# Patient Record
Sex: MALE | Race: WHITE
[De-identification: names, ages, dates, MRNs, and addresses within clinical notes are randomized per-mention and may not be internally consistent; named-entity substitution may affect disease eponyms.]

---

## 2017-11-09 ENCOUNTER — HOSPITAL ENCOUNTER (OUTPATIENT)
Dept: HOSPITAL 62 - RAD | Age: 37
End: 2017-11-09
Attending: FAMILY MEDICINE
Payer: MEDICARE

## 2017-11-09 DIAGNOSIS — R13.10: Primary | ICD-10-CM

## 2017-11-09 DIAGNOSIS — I10: ICD-10-CM

## 2017-11-09 DIAGNOSIS — K21.9: ICD-10-CM

## 2017-11-09 DIAGNOSIS — G40.909: ICD-10-CM

## 2017-11-09 PROCEDURE — 92611 MOTION FLUOROSCOPY/SWALLOW: CPT

## 2017-11-09 PROCEDURE — 74230 X-RAY XM SWLNG FUNCJ C+: CPT

## 2017-11-09 NOTE — ST MODIFIED BARIUM SWALLOW
Recommendation





- Recommendations


Recommendations: Patient at high risk of aspiration on all textures. Currently 

on safest PO texture, puree foods and pudding thick liquids. Consider 

alternative means of nutrition/hydration.





Medical Diagnoses





- Medical Diagnoses


Medical Diagnosis Description & ICD-10 Code(s): dysphagia, R13.10


Other Medical Diagnoses/Co-Morbidities: Facility caregiver reports: reflux, GERD

, HTN, epilepsy





ST Modified Barium Swallow





- General


Date: 11/09/17


Referring Physician: Bharathi Adorno MD


Risks/Precautions: Falls, Aspiration, Seizures


Reason for Referral: caregivers report coughing during meals





- History


History obtained from: Parent/Caregiver - Facility caregiver (nursing staff).


-: Medical - Patient's caregivers from facility present and provided medical 

history. Patient has developmental deficits and is a dependent feeder. He is 

currently on pureed solids and pudding thick liquids. Caregivers report that he 

has demonstrated coughing during meals frequently. Is seeing an occupational 

therapist for feeding, and is currently utilizing "head down, turn to left" 

positioning to aid in safe swallowing.


Medications: Caregiver reported:





- Functional Status


Prior Functional Status: INDEPENDENT: feeding - dependent feeder, on modified 

diet


Current Functional Limitations: feeding - dependent feeder, on modified diet





- Subjective


Patient/caregiver goal(s): r/o aspiration


Cognitive-Linguistic Function: Severely Impaired


Speech Intelligibility: Non-verbal


Current Nutritional Means: PO


Current PO diet: Pureed, Thickened liquids - pudding thick


Current symptoms: Poor intake, Coughing


Pain: unable to communicate, no signs/symptoms of pain





- Objective


Assessment: Upright, Left Lateral





- Food Trials Used


Food trials used: Pureed


The patient: fed by caregiver, via spoon





- Oral-Motor Skills


Dentition: Full


Laryngeal Function: no volitional swallow, no volitional cough/clear


Oral Motor Skills: 





Patient seen to have consistent open mouth posture, with head leaned back 

throughout exam. Seen to have difficulty taking trials from spoon, biting down 

on spoon, not using lips to clear spoon. Seen to hold food in mouth with mouth 

open as well.





- Assessment


Oral prep: Severely Impaired


Labial closure: Reduce closure


Leakage: None


Mastication: no chewing observed


Lingual Movement: Discoordinated


Oral stage: Significantly Impaired


Oral Stage: 





Patient seen to hold food trials in the mouth for extended period of time, 

mouth open with food on tongue. Little oral control seen.





- Pharyngeal Stage


Initiation of Pharyngeal Stage Reflex: Delayed


Reflex Delay Time (Seconds): 12


Decreased laryngeal elevation: No


Reduced pressure generation: Yes


reduced tongue-based retraction: Yes


Pre-swallow pooling in valleculae: Significant


Pre-Swallow pooling in pyriforms: Moderate


Reduced epiglottic excursion: No


Reduced pharyngeal peristalsis/contraction: Yes


Multiple Swallows with: Ineffective Clearance


Post-swallow residulas vallecular: Moderate


Post-Swallow residuals in pyriforms: Moderate


Reduced Cricopharyngeal opening: No


Pharyngeal Stage Comments: 


Significantly delayed swallow reflex seen for pudding trial. Significant 

residue seen in pyriform sinus after the swallow, which then was seen to spill 

over into airway. Minimal control of bolus seen.





- Fall Risk Assessment


Medications/Conditions that increase fall risks include: Antidepressants, 

sedatives, anti-arrhythmic, diuretic, benzodiazipenes, neuroleptics.  BP 

regulation problems, cardiac problems, balance or gait deficits, neurological 

problems.


Is patient considered at risk for falls: yes


Fall Risk Actions Taken: No action needed





- Behavioral Observations


Mental Status: Other - non-verbal





- Treatment / Educational Needs:


Treatment/Education Needs: Treatment consisted of patient education on the role 

of the Speech Pathologist.  Patient's plan of care and golas were communicated 

as well as scheduling and attendance policies.  Recommendations for initial 

home program were shared.  Patient demonstrated understanding and verbalized 

agreement.





- Impression/Summary


Laryngeal Penetration: Yes


Tracheal Aspiration: yes, deep, after swallow


Productive cough: No


Effective Clearing: no


Compesatory strategies: 





Unable to appropriately complete compensatory strategies.


Patient presents with: Oral-Pharyngeal dysph., Profound


Risk of Aspiration: Severe


Evaluation and Findings: Patient presented with profound oral and pharyngeal 

phase dysphagia. Poor bolus control seen orally, significantly delayed swallow 

reflex pharyngeally, aspiration of residue with poor ability to clear.  Current 

diet of puree and pudding liquids is deemed safest for patient, however, still 

at high risk for aspiration. Should consider alternative means of nutrition/

hydration.





- Recommendations


NPO: yes


Solid diet recommendations: Pureed - safest texure-still at high risk of 

aspiration


Strict aspiration precautions: Yes


Pt/Family education and followup with MD: Yes


Dysphagia therapy with SLP: f/u with current thera.


Recommended techniques: Fully Upright During Meal, Alternate Bites/Sips


Supervision: Constant


Information, Precautions and Recommendations: Family Member (Written), Family 

Member (Verbal)





- Time


Total Time: 20





- Plan of Care


Strategies to optimize patient understanding include:: ongoing assessment of 

educational needs, implementation of educational strategies, and re-education.





- -


-: Thank you for the opportunity to work with this patient and his/her family.  

Should you have any questions about this patient's plan or progress, I can be 

reached at [phone].





Charge G Code?





- -


-: Yes





ST F.L. Impairment Category





- Rationale Based On


Rationale Based On: Func. Asses. Tool Results





- Swallowing


Current : CM 80-99% Impaired


Goal : CM 80-99% Impaired


Discharge : CM 80-99% Impaired

## 2017-11-15 NOTE — RADIOLOGY REPORT (SQ)
EXAM DESCRIPTION:  YAAKOV SWALLOW



COMPLETED DATE/TIME:  11/9/2017 10:08 am



REASON FOR STUDY:  R13.10 DYSPHAGIA, UNSPECIFIED R13.10  DYSPHAGIA, UNSPECIFIED



COMPARISON:  None.



TECHNIQUE:  Videofluoroscopic swallowing examination was performed in conjunction with speech patholo
gy.  Videofluoroscopic imaging was obtained and reviewed and these are the findings:



RADIATION DOSE:  Fluoro time 4.3 minutes

1 images saved to PACS.



LIMITATIONS:  None



FINDINGS:  The patient was brought into the fluoro room and placed upright on a modified barium swall
ow chair.  The patient was then given multiple consistencies mixed with barium to swallow under live 
fluoroscopic video guidance.  According to the Speech Pathologist there was deep laryngeal penetratio
n seen from residuals. Please refer to the speech pathology report for further details.



IMPRESSION:  DEEP LARYNGEAL PENETRATION FROM RESIDUALS.  NO DEFINITE ASPIRATION IDENTIFIED.PLEASE SEE
 SPEECH PATHOLOGIST REPORT FOR OTHER FINDINGS AND RECOMMENDATIONS.



COMMENT:  None

Quality :  Final reports for procedures using fluoroscopy that document radiation exposure heath
anupam, or exposure time and number of fluorographic images (if radiation exposure indices are not avail
able)



TECHNICAL DOCUMENTATION:  JOB ID: [id number]

 2011 [employer]- All Rights Reserved

## 2018-02-19 ENCOUNTER — HOSPITAL ENCOUNTER (OUTPATIENT)
Dept: HOSPITAL 62 - OD | Age: 38
End: 2018-02-19
Attending: FAMILY MEDICINE
Payer: MEDICARE

## 2018-02-19 DIAGNOSIS — R05: Primary | ICD-10-CM

## 2018-02-19 LAB
ALBUMIN SERPL-MCNC: 5.1 G/DL (ref 3.5–5)
ALP SERPL-CCNC: 134 U/L (ref 38–126)
ALT SERPL-CCNC: 29 U/L (ref 21–72)
ANION GAP SERPL CALC-SCNC: 14 MMOL/L (ref 5–19)
AST SERPL-CCNC: 19 U/L (ref 17–59)
BILIRUB DIRECT SERPL-MCNC: 0.4 MG/DL (ref 0–0.4)
BILIRUB SERPL-MCNC: 0.7 MG/DL (ref 0.2–1.3)
BUN SERPL-MCNC: 7 MG/DL (ref 7–20)
CALCIUM: 9.4 MG/DL (ref 8.4–10.2)
CHLORIDE SERPL-SCNC: 103 MMOL/L (ref 98–107)
CO2 SERPL-SCNC: 24 MMOL/L (ref 22–30)
ERYTHROCYTE [DISTWIDTH] IN BLOOD BY AUTOMATED COUNT: 13.5 % (ref 11.5–14)
GLUCOSE SERPL-MCNC: 97 MG/DL (ref 75–110)
HCT VFR BLD CALC: 46.2 % (ref 37.9–51)
HGB BLD-MCNC: 16 G/DL (ref 13.5–17)
MCH RBC QN AUTO: 31.9 PG (ref 27–33.4)
MCHC RBC AUTO-ENTMCNC: 34.7 G/DL (ref 32–36)
MCV RBC AUTO: 92 FL (ref 80–97)
PLATELET # BLD: 306 10^3/UL (ref 150–450)
POTASSIUM SERPL-SCNC: 4.1 MMOL/L (ref 3.6–5)
PROT SERPL-MCNC: 8.4 G/DL (ref 6.3–8.2)
RBC # BLD AUTO: 5.03 10^6/UL (ref 4.35–5.55)
SODIUM SERPL-SCNC: 141.4 MMOL/L (ref 137–145)
WBC # BLD AUTO: 6.2 10^3/UL (ref 4–10.5)

## 2018-02-19 PROCEDURE — 80053 COMPREHEN METABOLIC PANEL: CPT

## 2018-02-19 PROCEDURE — 36415 COLL VENOUS BLD VENIPUNCTURE: CPT

## 2018-02-19 PROCEDURE — 85027 COMPLETE CBC AUTOMATED: CPT

## 2018-02-19 PROCEDURE — 71045 X-RAY EXAM CHEST 1 VIEW: CPT

## 2018-02-19 NOTE — RADIOLOGY REPORT (SQ)
EXAM DESCRIPTION:  CHEST SINGLE VIEW



COMPLETED DATE/TIME:  2/19/2018 4:32 pm



REASON FOR STUDY:  COUGH



COMPARISON:  6/11/2015.



EXAM PARAMETERS:  NUMBER OF VIEWS: One view.

TECHNIQUE: Single frontal radiographic view of the chest acquired.

RADIATION DOSE: NA

LIMITATIONS: None.



FINDINGS:  LUNGS AND PLEURA: No acute infiltrates.  No pleural effusions.  No pneumothorax.  No pneum
omediastinum.

MEDIASTINUM AND HILAR STRUCTURES: No masses.  Contour normal.

HEART AND VASCULAR STRUCTURES: The heart is normal.  The pulmonary vasculature is normal.

BONES: Bony structures are intact.

HARDWARE: None in the chest.

OTHER: No other significant finding.



IMPRESSION:  NO ACUTE DISEASE.



TECHNICAL DOCUMENTATION:  JOB ID:  [id number]

[id number]

 2011 [employer]- All Rights Reserved

## 2018-02-21 ENCOUNTER — HOSPITAL ENCOUNTER (OUTPATIENT)
Dept: HOSPITAL 62 - OD | Age: 38
End: 2018-02-21
Attending: FAMILY MEDICINE
Payer: MEDICARE

## 2018-02-21 DIAGNOSIS — R05: Primary | ICD-10-CM

## 2018-02-21 DIAGNOSIS — R50.9: ICD-10-CM

## 2018-02-21 PROCEDURE — 71045 X-RAY EXAM CHEST 1 VIEW: CPT

## 2018-02-21 NOTE — RADIOLOGY REPORT (SQ)
EXAM DESCRIPTION:  CHEST SINGLE VIEW



COMPLETED DATE/TIME:  2/21/2018 12:31 pm



REASON FOR STUDY:  COUGH



COMPARISON:  2/19/2018



EXAM PARAMETERS:  NUMBER OF VIEWS: One view.

TECHNIQUE: Single frontal radiographic view of the chest acquired.

RADIATION DOSE: NA

LIMITATIONS: Positioning.  Poor inspiratory effort.



FINDINGS:  LUNGS AND PLEURA: Segmental airspace disease with air bronchograms overlying the left hear
t border, probably in the left lower lobe.  No large effusions.  The right lung is clear.

MEDIASTINUM AND HILAR STRUCTURES: No masses.  Contour normal.

HEART AND VASCULAR STRUCTURES: Heart normal in size.  Normal vasculature.

BONES: No acute findings.

HARDWARE: None in the chest.

OTHER: No other significant finding.



IMPRESSION:  Left lower lobe pneumonia.



TECHNICAL DOCUMENTATION:  JOB ID:  [id number]

 2011 [employer]- All Rights Reserved

## 2018-02-23 ENCOUNTER — HOSPITAL ENCOUNTER (OUTPATIENT)
Dept: HOSPITAL 62 - OD | Age: 38
End: 2018-02-23
Attending: FAMILY MEDICINE
Payer: MEDICARE

## 2018-02-23 DIAGNOSIS — J18.9: Primary | ICD-10-CM

## 2018-02-23 DIAGNOSIS — R05: ICD-10-CM

## 2018-02-23 PROCEDURE — 71045 X-RAY EXAM CHEST 1 VIEW: CPT

## 2018-02-23 NOTE — RADIOLOGY REPORT (SQ)
EXAM DESCRIPTION:  CHEST SINGLE VIEW



COMPLETED DATE/TIME:  2/23/2018 3:32 pm



REASON FOR STUDY:  COUGH



COMPARISON:  AP chest 2/21/2018, 2/19/2018



EXAM PARAMETERS:  NUMBER OF VIEWS: One view.

TECHNIQUE: Single frontal radiographic view of the chest acquired.

RADIATION DOSE: NA

LIMITATIONS: Low lung volumes



FINDINGS:  LUNGS AND PLEURA: Partial clearing of the left lower lobe airspace disease compared to 2/2
1/2018.  Right lung clear.  No pleural effusions.  No pneumothorax.

MEDIASTINUM AND HILAR STRUCTURES: No masses.  Contour normal.

HEART AND VASCULAR STRUCTURES: Heart normal in size.  Normal vasculature.

BONES: No acute findings.

HARDWARE: None in the chest.

OTHER: Diffuse gaseous distension of colon loops under the hemidiaphragm, similar compared to previou
s studies.  Patient may have a colonic ileus



IMPRESSION:  Partial clearing of left lower lobe airspace disease compared to 2/21/2018.

Probable colonic ileus



TECHNICAL DOCUMENTATION:  JOB ID:  [id number]

 2011 [employer]- All Rights Reserved



Reading location - IP/workstation name: [facility][email or web address]

## 2018-02-26 ENCOUNTER — HOSPITAL ENCOUNTER (EMERGENCY)
Dept: HOSPITAL 62 - ER | Age: 38
Discharge: HOME | End: 2018-02-26
Payer: MEDICARE

## 2018-02-26 VITALS — SYSTOLIC BLOOD PRESSURE: 160 MMHG | DIASTOLIC BLOOD PRESSURE: 98 MMHG

## 2018-02-26 DIAGNOSIS — R50.9: ICD-10-CM

## 2018-02-26 DIAGNOSIS — J18.9: Primary | ICD-10-CM

## 2018-02-26 DIAGNOSIS — K59.00: ICD-10-CM

## 2018-02-26 DIAGNOSIS — R10.13: ICD-10-CM

## 2018-02-26 LAB
ADD MANUAL DIFF: NO
ALBUMIN SERPL-MCNC: 4.4 G/DL (ref 3.5–5)
ALP SERPL-CCNC: 113 U/L (ref 38–126)
ALT SERPL-CCNC: 28 U/L (ref 21–72)
ANION GAP SERPL CALC-SCNC: 12 MMOL/L (ref 5–19)
AST SERPL-CCNC: 36 U/L (ref 17–59)
BASOPHILS # BLD AUTO: 0 10^3/UL (ref 0–0.2)
BASOPHILS NFR BLD AUTO: 0.5 % (ref 0–2)
BILIRUB DIRECT SERPL-MCNC: 0.4 MG/DL (ref 0–0.4)
BILIRUB SERPL-MCNC: 0.4 MG/DL (ref 0.2–1.3)
BUN SERPL-MCNC: 13 MG/DL (ref 7–20)
CALCIUM: 8.1 MG/DL (ref 8.4–10.2)
CHLORIDE SERPL-SCNC: 106 MMOL/L (ref 98–107)
CO2 SERPL-SCNC: 25 MMOL/L (ref 22–30)
EOSINOPHIL # BLD AUTO: 0 10^3/UL (ref 0–0.6)
EOSINOPHIL NFR BLD AUTO: 0.1 % (ref 0–6)
ERYTHROCYTE [DISTWIDTH] IN BLOOD BY AUTOMATED COUNT: 13.3 % (ref 11.5–14)
GLUCOSE SERPL-MCNC: 108 MG/DL (ref 75–110)
HCT VFR BLD CALC: 39.7 % (ref 37.9–51)
HGB BLD-MCNC: 14 G/DL (ref 13.5–17)
LYMPHOCYTES # BLD AUTO: 0.7 10^3/UL (ref 0.5–4.7)
LYMPHOCYTES NFR BLD AUTO: 9.2 % (ref 13–45)
MCH RBC QN AUTO: 32.3 PG (ref 27–33.4)
MCHC RBC AUTO-ENTMCNC: 35.2 G/DL (ref 32–36)
MCV RBC AUTO: 92 FL (ref 80–97)
MONOCYTES # BLD AUTO: 0.4 10^3/UL (ref 0.1–1.4)
MONOCYTES NFR BLD AUTO: 4.6 % (ref 3–13)
NEUTROPHILS # BLD AUTO: 6.7 10^3/UL (ref 1.7–8.2)
NEUTS SEG NFR BLD AUTO: 85.6 % (ref 42–78)
PLATELET # BLD: 261 10^3/UL (ref 150–450)
POTASSIUM SERPL-SCNC: 3.9 MMOL/L (ref 3.6–5)
PROT SERPL-MCNC: 7.7 G/DL (ref 6.3–8.2)
RBC # BLD AUTO: 4.33 10^6/UL (ref 4.35–5.55)
SODIUM SERPL-SCNC: 143 MMOL/L (ref 137–145)
TOTAL CELLS COUNTED % (AUTO): 100 %
WBC # BLD AUTO: 7.8 10^3/UL (ref 4–10.5)

## 2018-02-26 PROCEDURE — 93010 ELECTROCARDIOGRAM REPORT: CPT

## 2018-02-26 PROCEDURE — 96361 HYDRATE IV INFUSION ADD-ON: CPT

## 2018-02-26 PROCEDURE — 36415 COLL VENOUS BLD VENIPUNCTURE: CPT

## 2018-02-26 PROCEDURE — 96374 THER/PROPH/DIAG INJ IV PUSH: CPT

## 2018-02-26 PROCEDURE — 85025 COMPLETE CBC W/AUTO DIFF WBC: CPT

## 2018-02-26 PROCEDURE — 93005 ELECTROCARDIOGRAM TRACING: CPT

## 2018-02-26 PROCEDURE — 71250 CT THORAX DX C-: CPT

## 2018-02-26 PROCEDURE — S0028 INJECTION, FAMOTIDINE, 20 MG: HCPCS

## 2018-02-26 PROCEDURE — 99284 EMERGENCY DEPT VISIT MOD MDM: CPT

## 2018-02-26 PROCEDURE — 71045 X-RAY EXAM CHEST 1 VIEW: CPT

## 2018-02-26 PROCEDURE — 80053 COMPREHEN METABOLIC PANEL: CPT

## 2018-02-26 PROCEDURE — 74176 CT ABD & PELVIS W/O CONTRAST: CPT

## 2018-02-26 NOTE — RADIOLOGY REPORT (SQ)
EXAM DESCRIPTION:  CHEST SINGLE VIEW



COMPLETED DATE/TIME:  2/26/2018 5:04 pm



REASON FOR STUDY:  hx of PNA



COMPARISON:  2/23/2018 and 2/21/2018.



EXAM PARAMETERS:  NUMBER OF VIEWS: One view.

TECHNIQUE: Single frontal radiographic view of the chest acquired.

RADIATION DOSE: NA

LIMITATIONS: None.



FINDINGS:  LUNGS AND PLEURA: Continued improved aeration in the left lung base.  Possible minimal res
idual in retrocardiac region, difficult to visualize due to overlying cardiac silhouette.  Right lung
 clear.

MEDIASTINUM AND HILAR STRUCTURES: No masses.  Contour normal.

HEART AND VASCULAR STRUCTURES: Heart normal in size.  Normal vasculature.

BONES: No acute findings.

HARDWARE: None in the chest.

OTHER: No other significant finding.



IMPRESSION:  CONTINUED IMPROVEMENT IN THE LEFT LOWER LOBE AIRSPACE DISEASE.  POSSIBLE MINIMAL RESIDUA
L IN THE RETROCARDIAC REGION.



TECHNICAL DOCUMENTATION:  JOB ID:  [id number]

 2011 [employer]- All Rights Reserved



Reading location - IP/workstation name: [email or web address]

## 2018-02-26 NOTE — RADIOLOGY REPORT (SQ)
EXAM DESCRIPTION:  CT ABD/PELVIS NO ORAL OR IV



COMPLETED DATE/TIME:  2/26/2018 5:39 pm



REASON FOR STUDY:  abd distension



COMPARISON:  None.



TECHNIQUE:  CT scan of the abdomen and pelvis performed without intravenous or oral contrast. Images 
reviewed with lung, soft tissue, and bone windows. Reconstructed coronal and sagittal MPR images revi
ewed. All images stored on PACS.

All CT scanners at this facility use dose modulation, iterative reconstruction, and/or weight based d
osing when appropriate to reduce radiation dose to as low as reasonably achievable (ALARA).

[facility]: Dose Right  [facility]: CareDose    [facility]: Dose Right    [facility]: Teradose 4D    [facility]: Smart Orth[employer]nergy



RADIATION DOSE:  CT Rad equipment meets quality standard of care and radiation dose reduction techniq
ues were employed. CTDIvol: 5.0 mGy. DLP: 330 mGy-cm.mGy.



LIMITATIONS:  Motion artifact.



FINDINGS:  LOWER CHEST: See separate report of the CT of the chest.

NON-CONTRASTED LIVER, SPLEEN, ADRENALS: Evaluation limited by lack of IV contrast. No identified sign
ificant masses.

PANCREAS: No masses. No peripancreatic inflammatory changes.

GALLBLADDER: No identified stones by CT criteria. No inflammatory changes to suggest cholecystitis.

RIGHT KIDNEY AND URETER: No suspicious masses. Assessment limited by lack of IV contrast.   No signif
icant calcifications.   No hydronephrosis or hydroureter.

LEFT KIDNEY AND URETER: No suspicious masses. Assessment limited by lack of IV contrast.   No signifi
cant calcifications.   No hydronephrosis or hydroureter.

AORTA AND RETROPERITONEUM: No aneurysm. No retroperitoneal masses or adenopathy.

BOWEL AND PERITONEAL CAVITY: Prominent gas is seen with in the large and small bowel without evidence
 of obstructing abnormality.  Findings suggest ileus.  Large amount of fecal material is seen within 
the transverse colon but there is no definite evidence of fecal impaction.

APPENDIX: Normal.

PELVIS, BLADDER, AND ABDOMINAL WALL:Small right inguinal hernia which contains a portion of the bladd
er but no loops of bowel.

BONES: Severe scoliosis with chronically dislocated left hip.

OTHER: No other significant finding.



IMPRESSION:  1.  Prominent gas in the large and small bowel without evidence of obstructing abnormali
ty most suggestive of ileus.

2.  Small right inguinal hernia containing a portion of the bladder.

3.  Chronic dislocation left hip.



COMMENT:  Quality ID # 436: Final reports with documentation of one or more dose reduction techniques
 (e.g., Automated exposure control, adjustment of the mA and/or kV according to patient size, use of 
iterative reconstruction technique)



TECHNICAL DOCUMENTATION:  JOB ID:  [id number]

 2011 [employer]- All Rights Reserved



Reading location - IP/workstation name: [email or web address]

## 2018-02-26 NOTE — RADIOLOGY REPORT (SQ)
EXAM DESCRIPTION:  CT CHEST WITHOUT



COMPLETED DATE/TIME:  2/26/2018 5:39 pm



REASON FOR STUDY:  concern for aspiration PNA



COMPARISON:  None.



TECHNIQUE:  CT scan performed of the chest without intravenous contrast.  Images reviewed with lung, 
soft tissue and bone windows.  Reconstructed coronal and sagittal MPR images reviewed.  All images st
ored on PACS.

All CT scanners at this facility use dose modulation, iterative reconstruction, and/or weight based d
osing when appropriate to reduce radiation dose to as low as reasonably achievable (ALARA).

[facility]: Dose Right  [facility]: CareDose    [facility]: Dose Right    [facility]: Teradose 4D    [facility]: Smart Technologies



RADIATION DOSE:  5 mGy.



LIMITATIONS:  Motion artifact.



FINDINGS:  LUNGS AND PLEURA: Mild increased density in the left lower lobe consistent with pneumonia.
  Remainder the lungs are clear.  No effusions.

HILAR AND MEDIASTINAL STRUCTURES: No identified masses or abnormal nodes.  No obvious aneurysm.

HEART AND VASCULAR STRUCTURES: No aneurysm.  No pericardial effusion.

UPPER ABDOMEN: See separate report of the CT of the abdomen.

THYROID AND OTHER SOFT TISSUES: No masses.  No adenopathy.

BONES: No significant finding.

HARDWARE: None in the chest.

OTHER: No other significant findings.



IMPRESSION:  1.  Suboptimal examination due to motion artifact.

2.  Mild increased density in the left lower lobe consistent with pneumonia.



TECHNICAL DOCUMENTATION:  JOB ID:  [id number]

Quality ID # 436: Final reports with documentation of one or more dose reduction techniques (e.g., Au
tomated exposure control, adjustment of the mA and/or kV according to patient size, use of iterative 
reconstruction technique)

 2011 [employer]- All Rights Reserved



Reading location - IP/workstation name: [email or web address]

## 2018-02-26 NOTE — ER DOCUMENT REPORT
ED General





<DEVONTE COY - Last Filed: 02/26/18 18:45>





- General


TRAVEL OUTSIDE OF THE [place] IN LAST 30 DAYS: No





<PAULIE LUONG - Last Filed: 02/26/18 23:14>





- General


Chief Complaint: Fever


Stated Complaint: FEVER


Time Seen by Provider: 02/26/18 16:18


Notes: 





Patient with a history of cerebral palsy presents from living facility with 

aids due to concern of patient acting like he is gagging.  He is being treated 

for pneumonia and is on clindamycin and Bactrim which was started last week.  

He was noted to have a low-grade fever of 100.9.  They state that he is not 

acting himself and appears uncomfortable so brought him in for evaluation.  

Denies any recent vomiting or diarrhea.  Patient is nonverbal due to his 

cerebral palsy (PAULIE LUONG)





- Related Data


Allergies/Adverse Reactions: 


 





No Known Allergies Allergy (Verified 12/12/13 15:21)


 











Past Medical History





- Social History


Smoking Status: Never Smoker


Chew tobacco use (# tins/day): No


Frequency of alcohol use: None


Drug Abuse: None


Family History: Hypertension


Patient has suicidal ideation: No


Patient has homicidal ideation: No





- Past Medical History


Cardiac Medical History: 


   Denies: Hx Coronary Artery Disease, Hx Heart Attack, Hx Hypertension


Pulmonary Medical History: 


   Denies: Hx Asthma, Hx Bronchitis, Hx COPD, Hx Pneumonia


Neurological Medical History: Reports: Hx Seizures.  Denies: Hx Cerebrovascular 

Accident


Renal/ Medical History: Denies: Hx Peritoneal Dialysis


Musculoskeltal Medical History: Denies Hx Arthritis





- Immunizations


Hx Diphtheria, Pertussis, Tetanus Vaccination: Yes





<PAULIE LUONG - Last Filed: 02/26/18 23:14>





Review of Systems





- Review of Systems


-: Yes ROS unobtainable due to patient's medical condition





<PAULIE LUONG - Last Filed: 02/26/18 23:14>





Physical Exam





- General


General appearance: Appears well





- HEENT


Head: Normocephalic, Atraumatic





- Respiratory


Respiratory status: No respiratory distress


Chest status: Nontender


Breath sounds: Other - Mild coarse breath sounds bilateral lower lung lobes





- Cardiovascular


Rhythm: Tachycardia


Heart sounds: Normal auscultation





- Abdominal


Inspection: Normal


Distension: No distension


Bowel sounds: Normal





- Back


Back: Normal





- Extremities


General upper extremity: Other - Contractures of upper and lower extremities 

due to cerebral palsy





<CHERISEPAULIE - Last Filed: 02/26/18 23:14>





- Vital signs


Vitals: 


 











Temp Pulse Resp BP Pulse Ox


 


 100.9 F H  91   22 H  150/99 H  96 


 


 02/26/18 15:44  02/26/18 15:44  02/26/18 15:44  02/26/18 15:44  02/26/18 15:44














Course





- Laboratory


Result Diagrams: 


 02/26/18 17:14





 02/26/18 16:44





<DEVONTE COY - Last Filed: 02/26/18 18:45>





- Laboratory


Result Diagrams: 


 02/26/18 17:14





 02/26/18 16:44





- EKG Interpretation by Me


EKG shows normal: Sinus rhythm


Rate: Tachycardia


Rhythm: NSR





<PAULIE LUONG - Last Filed: 02/26/18 23:14>





- Re-evaluation


Re-evalutation: 





02/26/18 16:23


Patient currently being treated at nursing facility for pneumonia.  Presents 

the emergency department as a state he is not acting himself and appears that 

he is gagging.  Will run labs and chest x-ray medically evaluated at this time.


02/26/18 17:20


Dr. Adorno in the emergency department who is patient's primary care physician.  

He advised performing CT chest abdomen pelvis as patient has had continued 

pneumonia and concern for aspiration as well as mild abdominal distention


02/26/18 18:28


Patient CT chest abdomen pelvis shows right lower lobe pneumonia with signs of 

large amount of stool burden signs consistent with ileus with no mechanical 

obstruction.  Will discuss case with Dr. Adorno.


02/26/18 18:39


Discussed case with Dr. Adorno.  Advised him the findings on CAT scans.  Patient 

will be placed on bowel regimen as well as Protonix vs Dr. Adorno.  Instructed 

caregivers to follow-up in the next 2-3 days if symptoms are not improving.


02/26/18 18:52





02/26/18 18:54


Patient's tachycardia improved with fluids and Tylenol. (CHERISEPAULIE PERES)





- Vital Signs


Vital signs: 


 











Temp Pulse Resp BP Pulse Ox


 


 98.6 F   104 H  18   160/98 H  98 


 


 02/26/18 18:44  02/26/18 18:44  02/26/18 18:44  02/26/18 18:44  02/26/18 18:44














- Laboratory


Laboratory results interpreted by me: 


 











  02/26/18 02/26/18





  16:44 17:14


 


RBC   4.33 L


 


Seg Neutrophils %   85.6 H


 


Lymphocytes %   9.2 L


 


Calcium  8.1 L 














Discharge





<ZBIGNIEWDEVONTE CAMPOS - Last Filed: 02/26/18 18:45>





<PAULIE LUONG - Last Filed: 02/26/18 23:14>





- Discharge


Clinical Impression: 


 Epigastric discomfort





Constipated


Qualifiers:


 Constipation type: unspecified constipation type Qualified Code(s): K59.00 - 

Constipation, unspecified





Pneumonia


Qualifiers:


 Pneumonia type: due to unspecified organism Lung location: unspecified part of 

lung 





Condition: Good


Disposition: HOME, SELF-CARE


Instructions:  Constipation ([facility]), Evaluation of Upper Abdominal Pain ([facility]), 

Pneumonia ([facility])


Additional Instructions: 


Follow up with Dr. Adorno as soon as possible.  Please return to the ED if 

symptoms worsen or any other concerning symptoms arise.


Prescriptions: 


Pantoprazole Sodium [Protonix] 20 mg PO DAILY #30 tablet.


Polyethylene Glycol 3350 [Miralax Powder 17 gm/Packet] 1 packet PO DAILY #30 pkg


Ranitidine HCl [Zantac Syrp 150 mg/10 ml Ud (Pediatric Only)] 150 mg PO BID #1 

bottle


Referrals: 


KE ADORNO MD [Primary Care Provider] - Follow up as needed


Scribe Attestation: 





02/26/18 23:14


I personally performed the services described documentation, reviewed and 

edited the documentation which was dictated to describe my presence, and it 

accurately records my words and actions. (PAULIE LUONG)

## 2018-02-26 NOTE — EKG REPORT
SEVERITY:- ABNORMAL ECG -

SINUS TACHYCARDIA

NONSPECIFIC ST-T CHANGES- INFERIOR LEADS

:

Confirmed by: Avni Burrows MD 26-Feb-2018 19:18:11

## 2018-03-07 ENCOUNTER — HOSPITAL ENCOUNTER (OUTPATIENT)
Dept: HOSPITAL 62 - OD | Age: 38
End: 2018-03-07
Attending: FAMILY MEDICINE
Payer: MEDICARE

## 2018-03-07 DIAGNOSIS — J69.0: Primary | ICD-10-CM

## 2018-03-07 PROCEDURE — 71045 X-RAY EXAM CHEST 1 VIEW: CPT

## 2018-03-07 NOTE — RADIOLOGY REPORT (SQ)
EXAM DESCRIPTION:  CHEST SINGLE VIEW



COMPLETED DATE/TIME:  3/7/2018 9:26 am



REASON FOR STUDY:  PNEUMONITIS DUE TO INHALATION OF FOOD AND VOMIT



COMPARISON:  2/26/2018.



NUMBER OF VIEWS:  One view.



TECHNIQUE:  Single frontal radiographic view of the chest acquired.



LIMITATIONS:  None.



FINDINGS:  LUNGS AND PLEURA: Right lung remains clear.  Persistent left lower lobe opacity, suspiciou
s for unresolved pneumonia.

MEDIASTINUM AND HILAR STRUCTURES: Stable contours.

HEART AND VASCULAR STRUCTURES: Stable heart size.  No evidence of failure.

BONES: Slight scoliotic curve.

HARDWARE: None in the chest.

OTHER: No other significant finding.



IMPRESSION:  Persistent left lower lobe infiltrate.



TECHNICAL DOCUMENTATION:  JOB ID:  [id number]

 2011 [employer]- All Rights Reserved



Reading location - IP/workstation name: [email or web address]

## 2018-03-23 ENCOUNTER — HOSPITAL ENCOUNTER (OUTPATIENT)
Dept: HOSPITAL 62 - OD | Age: 38
End: 2018-03-23
Attending: FAMILY MEDICINE
Payer: MEDICARE

## 2018-03-23 DIAGNOSIS — Z87.01: ICD-10-CM

## 2018-03-23 DIAGNOSIS — J69.0: Primary | ICD-10-CM

## 2018-03-23 PROCEDURE — 71045 X-RAY EXAM CHEST 1 VIEW: CPT

## 2018-03-23 NOTE — RADIOLOGY REPORT (SQ)
EXAM DESCRIPTION:  CHEST SINGLE VIEW



COMPLETED DATE/TIME:  3/23/2018 8:51 am



REASON FOR STUDY:  PERSONAL HISTORY OF PNEUMONIA (RECURRENT)



COMPARISON:  3/7/2018



EXAM PARAMETERS:  NUMBER OF VIEWS: One view.

TECHNIQUE: Single frontal radiographic view of the chest acquired.

RADIATION DOSE: NA

LIMITATIONS: None.



FINDINGS:  LUNGS AND PLEURA: No opacities, masses or pneumothorax. No pleural effusion.

MEDIASTINUM AND HILAR STRUCTURES: No masses.  Contour normal.

HEART AND VASCULAR STRUCTURES: Heart normal in size.  Normal vasculature.

BONES: No acute findings.

HARDWARE: None in the chest.

OTHER: No other significant finding.



IMPRESSION:  NO ACUTE RADIOGRAPHIC FINDING IN THE CHEST.  THE INFILTRATE IN THE LEFT BASE HAS RESOLVE
D.



TECHNICAL DOCUMENTATION:  JOB ID:  [id number]

 2011 [employer]- All Rights Reserved



Reading location - IP/workstation name: [email or web address]

## 2018-05-25 ENCOUNTER — HOSPITAL ENCOUNTER (EMERGENCY)
Dept: HOSPITAL 62 - ER | Age: 38
Discharge: ANOTHER HOSPITAL | End: 2018-05-25
Payer: MEDICARE

## 2018-05-25 VITALS — DIASTOLIC BLOOD PRESSURE: 123 MMHG | SYSTOLIC BLOOD PRESSURE: 160 MMHG

## 2018-05-25 DIAGNOSIS — R50.9: ICD-10-CM

## 2018-05-25 DIAGNOSIS — J18.9: ICD-10-CM

## 2018-05-25 DIAGNOSIS — K56.7: Primary | ICD-10-CM

## 2018-05-25 DIAGNOSIS — I10: ICD-10-CM

## 2018-05-25 DIAGNOSIS — Z98.890: ICD-10-CM

## 2018-05-25 DIAGNOSIS — R53.1: ICD-10-CM

## 2018-05-25 LAB
ADD MANUAL DIFF: NO
ALBUMIN SERPL-MCNC: 4.3 G/DL (ref 3.5–5)
ALP SERPL-CCNC: 84 U/L (ref 38–126)
ALT SERPL-CCNC: 19 U/L (ref 21–72)
ANION GAP SERPL CALC-SCNC: 16 MMOL/L (ref 5–19)
APPEARANCE UR: CLEAR
APTT PPP: YELLOW S
AST SERPL-CCNC: 21 U/L (ref 17–59)
BASE EXCESS BLDV CALC-SCNC: 1.2 MMOL/L
BASOPHILS # BLD AUTO: 0 10^3/UL (ref 0–0.2)
BASOPHILS NFR BLD AUTO: 0.4 % (ref 0–2)
BILIRUB DIRECT SERPL-MCNC: 0.4 MG/DL (ref 0–0.4)
BILIRUB SERPL-MCNC: 0.8 MG/DL (ref 0.2–1.3)
BILIRUB UR QL STRIP: NEGATIVE
BUN SERPL-MCNC: 20 MG/DL (ref 7–20)
CALCIUM: 9.3 MG/DL (ref 8.4–10.2)
CHLORIDE SERPL-SCNC: 101 MMOL/L (ref 98–107)
CO2 SERPL-SCNC: 27 MMOL/L (ref 22–30)
EOSINOPHIL # BLD AUTO: 0 10^3/UL (ref 0–0.6)
EOSINOPHIL NFR BLD AUTO: 0 % (ref 0–6)
ERYTHROCYTE [DISTWIDTH] IN BLOOD BY AUTOMATED COUNT: 13.4 % (ref 11.5–14)
GLUCOSE SERPL-MCNC: 136 MG/DL (ref 75–110)
GLUCOSE UR STRIP-MCNC: NEGATIVE MG/DL
HCO3 BLDV-SCNC: 24.8 MMOL/L (ref 20–32)
HCT VFR BLD CALC: 44.3 % (ref 37.9–51)
HGB BLD-MCNC: 15.4 G/DL (ref 13.5–17)
INR PPP: 1.08
KETONES UR STRIP-MCNC: (no result) MG/DL
LYMPHOCYTES # BLD AUTO: 0.6 10^3/UL (ref 0.5–4.7)
LYMPHOCYTES NFR BLD AUTO: 8.3 % (ref 13–45)
MCH RBC QN AUTO: 31.9 PG (ref 27–33.4)
MCHC RBC AUTO-ENTMCNC: 34.8 G/DL (ref 32–36)
MCV RBC AUTO: 92 FL (ref 80–97)
MONOCYTES # BLD AUTO: 0.6 10^3/UL (ref 0.1–1.4)
MONOCYTES NFR BLD AUTO: 7.8 % (ref 3–13)
NEUTROPHILS # BLD AUTO: 6.2 10^3/UL (ref 1.7–8.2)
NEUTS SEG NFR BLD AUTO: 83.5 % (ref 42–78)
NITRITE UR QL STRIP: NEGATIVE
PCO2 BLDV: 36.6 MMHG (ref 35–63)
PH BLDV: 7.45 [PH] (ref 7.3–7.42)
PH UR STRIP: 6 [PH] (ref 5–9)
PLATELET # BLD: 260 10^3/UL (ref 150–450)
POTASSIUM SERPL-SCNC: 4.1 MMOL/L (ref 3.6–5)
PROT SERPL-MCNC: 7.4 G/DL (ref 6.3–8.2)
PROT UR STRIP-MCNC: NEGATIVE MG/DL
PROTHROMBIN TIME: 14.6 SEC (ref 11.4–15.4)
RBC # BLD AUTO: 4.82 10^6/UL (ref 4.35–5.55)
SODIUM SERPL-SCNC: 143.5 MMOL/L (ref 137–145)
SP GR UR STRIP: 1.02
TOTAL CELLS COUNTED % (AUTO): 100 %
UROBILINOGEN UR-MCNC: NEGATIVE MG/DL (ref ?–2)
WBC # BLD AUTO: 7.5 10^3/UL (ref 4–10.5)

## 2018-05-25 PROCEDURE — 82272 OCCULT BLD FECES 1-3 TESTS: CPT

## 2018-05-25 PROCEDURE — 93005 ELECTROCARDIOGRAM TRACING: CPT

## 2018-05-25 PROCEDURE — 80053 COMPREHEN METABOLIC PANEL: CPT

## 2018-05-25 PROCEDURE — 36415 COLL VENOUS BLD VENIPUNCTURE: CPT

## 2018-05-25 PROCEDURE — 93010 ELECTROCARDIOGRAM REPORT: CPT

## 2018-05-25 PROCEDURE — 71045 X-RAY EXAM CHEST 1 VIEW: CPT

## 2018-05-25 PROCEDURE — 83605 ASSAY OF LACTIC ACID: CPT

## 2018-05-25 PROCEDURE — 74018 RADEX ABDOMEN 1 VIEW: CPT

## 2018-05-25 PROCEDURE — 96361 HYDRATE IV INFUSION ADD-ON: CPT

## 2018-05-25 PROCEDURE — 87086 URINE CULTURE/COLONY COUNT: CPT

## 2018-05-25 PROCEDURE — 85610 PROTHROMBIN TIME: CPT

## 2018-05-25 PROCEDURE — 87040 BLOOD CULTURE FOR BACTERIA: CPT

## 2018-05-25 PROCEDURE — 99291 CRITICAL CARE FIRST HOUR: CPT

## 2018-05-25 PROCEDURE — 81001 URINALYSIS AUTO W/SCOPE: CPT

## 2018-05-25 PROCEDURE — 82803 BLOOD GASES ANY COMBINATION: CPT

## 2018-05-25 PROCEDURE — 85025 COMPLETE CBC W/AUTO DIFF WBC: CPT

## 2018-05-25 PROCEDURE — S0119 ONDANSETRON 4 MG: HCPCS

## 2018-05-25 PROCEDURE — 96365 THER/PROPH/DIAG IV INF INIT: CPT

## 2018-05-25 NOTE — RADIOLOGY REPORT (SQ)
EXAM DESCRIPTION:  CHEST SINGLE VIEW



COMPLETED DATE/TIME:  5/25/2018 11:44 am



REASON FOR STUDY:  Fever



COMPARISON:  CHEST FILMS 6/11/2015, 2/19/2018, 2/26/2018, 3/7/2018, 3/23/2018

CT CHEST 2/26/2018



EXAM PARAMETERS:  NUMBER OF VIEWS: One view.

TECHNIQUE: Single frontal radiographic view of the chest acquired.

RADIATION DOSE: NA

LIMITATIONS: None.



FINDINGS:  LUNGS AND PLEURA: DENSE CONSOLIDATION LEFT LOWER LOBE WORRISOME FOR PNEUMONIA.

RIGHT LUNG CLEAR.

NO PLEURAL EFFUSION.  NO PNEUMOTHORAX.

MEDIASTINUM AND HILAR STRUCTURES: No masses.  Contour normal.

HEART AND VASCULAR STRUCTURES: Heart normal in size.  Normal vasculature.

BONES: No acute findings.

HARDWARE: None in the chest.

OTHER: Air-filled dilated stomach small bowel and colon under the hemidiaphragms could reflect an ile
us.



IMPRESSION:  Left lower lobe pneumonia with air bronchograms.



TECHNICAL DOCUMENTATION:  JOB ID:  [id number]

 2011 [employer]- All Rights Reserved



Reading location - IP/workstation name: [facility]

## 2018-05-25 NOTE — ER DOCUMENT REPORT
ED Fever





- General


Chief Complaint: Fever


Stated Complaint: WEAKNESS


Time Seen by Provider: 05/25/18 10:38


Notes: 





Patient is a 38-year-old male with a history of cerebral palsy who lives in a 

local group home.  He underwent placement of a feeding tube in the left upper 

quadrant of his abdomen 2 days ago, on Wednesday, at [place] and was 

discharged home from there last night.  At the time of his discharge, he was 

noted to have an axillary temperature of 100.7.  Caregiver who is here with the 

patient says that he is acting as if he is "uncomfortable" with more grunting 

than usual.  She says that he is normally more interactive and smiling then he 

is at this time.  He is vomited about 5 times this morning.  Caregiver says his 

last bowel movement was yesterday.  Has been having some coughing and 

congestion today, as well.  History of aspiration pneumonias, the primary 

reason for the patient getting the feeding tube put in.








TRAVEL OUTSIDE OF THE [place] IN LAST 30 DAYS: No





- Related Data


Allergies/Adverse Reactions: 


 





No Known Allergies Allergy (Verified 12/12/13 15:21)


 











Past Medical History





- Social History


Smoking Status: Never Smoker


Family History: Reviewed & Not Pertinent, Hypertension





- Past Medical History


Cardiac Medical History: Reports: Hx Hypertension


   Denies: Hx Coronary Artery Disease, Hx Heart Attack


Pulmonary Medical History: 


   Denies: Hx Asthma, Hx Bronchitis, Hx COPD, Hx Pneumonia


Neurological Medical History: Reports: Hx Seizures, Other - History of cerebral 

palsy.  Denies: Hx Cerebrovascular Accident


Musculoskeltal Medical History: Denies Hx Arthritis





- Immunizations


Hx Diphtheria, Pertussis, Tetanus Vaccination: Yes





Review of Systems





- Review of Systems


Notes: 





See HPI.  Patient is unable to provide any information.  Care provider at group 

[facility] here providing some review of systems.


-: Yes ROS unobtainable due to patient's medical condition





Physical Exam





- Vital signs


Vitals: 


 











Resp Pulse Ox


 


 35 H  93 


 


 05/25/18 10:30  05/25/18 10:30











Interpretation: Febrile





- Notes


Notes: 





PHYSICAL EXAMINATION:





GENERAL: Appears to be in no acute distress.  Unable to answer questions.  

Unable to follow commands.  Very severe deformities of all 4 extremities due to 

his cerebral palsy.  Low-grade fever.





HEAD: Atraumatic, normocephalic.





EYES: Pupils equal round and reactive to light, extraocular movements intact.





ENT: oropharynx clear without exudates.  Dry mucous membranes.





NECK: Normal range of motion for him.  No involuntary resistance felt.





LUNGS: Breath sounds clear and equal bilaterally.





HEART: Regular rate and rhythm without murmurs.  Heart rate about 110 by me at 

bedside.





ABDOMEN: Patient has a feeding tube in the left upper quadrant.  No erythema or 

swelling around the site.  Abdomen appears to be distended and is hyper 

tympanitic.  This may be chronic, as the caregiver says he is distended at most 

times.  Last bowel movement was yesterday.  Rectal exam reveals no stool in the 

rectum.  Prostate enlarged but not tender.  Guaiac obtained and sent to lab and 

was positive, although no indication of blood from my exam.





BACK:  No tenderness throughout entire back.





EXTREMITIES: Severe deformities and contractures.





NEUROLOGICAL: Severely mentally challenged.  Unable to speak, follow commands, 

etc.  Bed confined.





PSYCH: Unable to assess.





SKIN: Warm, dry, no rashes.





Course





- Re-evaluation


Re-evalutation: 





05/25/18 11:20


Discussed patient with Dr. Sheffield, his primary care provider, who recommends 

sending the patient back to [place] for further care.  We have no 

gastroenterology available, on call today.








05/25/18 13:27


Spoke with Dr. Selby, at [facility] in [place], and he accepts the patient in 

transfer.





Patient has had several vomiting episodes here in the department this morning.  

I have given him Zofran.  Blood pressure has been noted to be significantly 

elevated at times, but at other times it slips back down to an acceptable 

level.  At this time, I am not giving him anything for the blood pressure.





- Vital Signs


Vital signs: 


 











Temp Pulse Resp BP Pulse Ox


 


 101.4 F H     24 H  156/114 H  94 


 


 05/25/18 10:38     05/25/18 13:04  05/25/18 13:04  05/25/18 13:04














- Laboratory


Result Diagrams: 


 05/25/18 09:50





 05/25/18 09:20


Laboratory results interpreted by me: 


 











  05/25/18 05/25/18 05/25/18





  09:20 09:50 10:30


 


Seg Neutrophils %   83.5 H 


 


Lymphocytes %   8.3 L 


 


VBG pH    7.45 H


 


Glucose  136 H  


 


ALT  19 L  


 


Urine Ketones   














  05/25/18





  11:40


 


Seg Neutrophils % 


 


Lymphocytes % 


 


VBG pH 


 


Glucose 


 


ALT 


 


Urine Ketones  TRACE H














- Diagnostic Test


Radiology results interpreted by me: 





05/25/18 13:27


Chest x-ray shows a left lower lobe pneumonia.  Abdominal film shows gaseous 

distention of small and large bowel consistent with an ileus.





- EKG Interpretation by Me


EKG shows normal: Sinus rhythm


Rate: Tachycardia


Rhythm: NSR





Critical Care Note





- Critical Care Note


Total time excluding time spent on procedures (mins): 45





Discharge





- Discharge


Clinical Impression: 


 Ileus, Pneumonia, Fever





Condition: Stable


Disposition: [facility]


Referrals: 


KE SHEFFIELD MD [Primary Care Provider] - Follow up as needed

## 2018-05-25 NOTE — EKG REPORT
SEVERITY:- ABNORMAL ECG -

SINUS TACHYCARDIA

ABNORMAL T, CONSIDER ISCHEMIA, INFERIOR LEADS

:

Confirmed by: Avni Burrows MD 25-May-2018 13:13:48

## 2018-05-25 NOTE — RADIOLOGY REPORT (SQ)
EXAM DESCRIPTION:  KUB/ABDOMEN (SINGLE VIEW)



COMPLETED DATE/TIME:  5/25/2018 11:44 am



REASON FOR STUDY:  Distended abdomen with abdominal pain, feeding tub



COMPARISON:  CT abdomen pelvis 2/26/2018



NUMBER OF VIEWS:  One view.



TECHNIQUE:   Supine radiographic image of the abdomen acquired.



LIMITATIONS:  None.



FINDINGS:  BOWEL GAS PATTERN: Diffuse gaseous distension of colon and small bowel.  Moderate stool th
roughout the colon.  Gastrostomy tube tip in the stomach.

CALCIFICATIONS: No suspicious calcifications.

SOFT TISSUES: No gross mass or suggestion of organomegaly.

HARDWARE: Gastrostomy tube tip in the stomach

BONES: Convex rightward lumbar curvature.  Chronic left hip dislocation

OTHER: No other significant finding.



IMPRESSION:  Gaseous distension of small bowel and colon probably reflects an ileus



TECHNICAL DOCUMENTATION:  JOB ID:  [id number]

 2011 [employer]- All Rights Reserved



Reading location - IP/workstation name: [email or web address]

## 2018-09-07 ENCOUNTER — HOSPITAL ENCOUNTER (OUTPATIENT)
Dept: HOSPITAL 62 - CAR | Age: 38
End: 2018-09-07
Attending: FAMILY MEDICINE
Payer: MEDICARE

## 2018-09-07 DIAGNOSIS — J69.0: Primary | ICD-10-CM

## 2018-09-07 DIAGNOSIS — R05: ICD-10-CM

## 2018-09-07 LAB
ANION GAP SERPL CALC-SCNC: 17 MMOL/L (ref 5–19)
BUN SERPL-MCNC: 13 MG/DL (ref 7–20)
CALCIUM: 9.6 MG/DL (ref 8.4–10.2)
CHLORIDE SERPL-SCNC: 101 MMOL/L (ref 98–107)
CO2 SERPL-SCNC: 26 MMOL/L (ref 22–30)
ERYTHROCYTE [DISTWIDTH] IN BLOOD BY AUTOMATED COUNT: 13 % (ref 11.5–14)
GLUCOSE SERPL-MCNC: 95 MG/DL (ref 75–110)
HCT VFR BLD CALC: 46.1 % (ref 37.9–51)
HGB BLD-MCNC: 16.4 G/DL (ref 13.5–17)
MCH RBC QN AUTO: 33.8 PG (ref 27–33.4)
MCHC RBC AUTO-ENTMCNC: 35.5 G/DL (ref 32–36)
MCV RBC AUTO: 95 FL (ref 80–97)
PLATELET # BLD: 276 10^3/UL (ref 150–450)
POTASSIUM SERPL-SCNC: 4.1 MMOL/L (ref 3.6–5)
RBC # BLD AUTO: 4.84 10^6/UL (ref 4.35–5.55)
SODIUM SERPL-SCNC: 143.7 MMOL/L (ref 137–145)
WBC # BLD AUTO: 5.1 10^3/UL (ref 4–10.5)

## 2018-09-07 PROCEDURE — 85027 COMPLETE CBC AUTOMATED: CPT

## 2018-09-07 PROCEDURE — 71045 X-RAY EXAM CHEST 1 VIEW: CPT

## 2018-09-07 PROCEDURE — 80048 BASIC METABOLIC PNL TOTAL CA: CPT

## 2018-09-07 NOTE — RADIOLOGY REPORT (SQ)
EXAM DESCRIPTION:  CHEST SINGLE VIEW



COMPLETED DATE/TIME:  9/7/2018 8:42 am



REASON FOR STUDY:  PNEUMONITIS DUE TO INHALATION OF FOOD AND VOMIT,COUGH



COMPARISON:  5/25/2018.



EXAM PARAMETERS:  NUMBER OF VIEWS: One view.

TECHNIQUE: Single frontal radiographic view of the chest acquired.

RADIATION DOSE: NA

LIMITATIONS: None.



FINDINGS:  LUNGS AND PLEURA: Few ill-defined linear densities scattered throughout the lungs.  Previo
us seen infiltrate in the left lower lobe has resolved.  No large pleural effusion.  No pneumothorax.


MEDIASTINUM AND HILAR STRUCTURES: No masses.  Contour normal.

HEART AND VASCULAR STRUCTURES: Heart normal in size.  Normal vasculature.

BONES: No acute findings.

HARDWARE: None in the chest.

OTHER: No other significant finding.



IMPRESSION:  LEFT LOWER LOBE INFILTRATE HAS RESOLVED.  MILD SCATTERED ATELECTASIS.



TECHNICAL DOCUMENTATION:  JOB ID:  [id number]

 2011 [employer]- All Rights Reserved



Reading location - IP/workstation name: [facility]

## 2019-12-12 ENCOUNTER — HOSPITAL ENCOUNTER (OUTPATIENT)
Dept: HOSPITAL 62 - CAR | Age: 39
End: 2019-12-12
Attending: FAMILY MEDICINE
Payer: MEDICARE

## 2019-12-12 DIAGNOSIS — R50.9: Primary | ICD-10-CM

## 2019-12-12 LAB
ADD MANUAL DIFF: NO
ALBUMIN SERPL-MCNC: 4.2 G/DL (ref 3.5–5)
ALP SERPL-CCNC: 114 U/L (ref 38–126)
ANION GAP SERPL CALC-SCNC: 14 MMOL/L (ref 5–19)
AST SERPL-CCNC: 31 U/L (ref 17–59)
BASOPHILS # BLD AUTO: 0 10^3/UL (ref 0–0.2)
BASOPHILS NFR BLD AUTO: 0.3 % (ref 0–2)
BILIRUB DIRECT SERPL-MCNC: 0.1 MG/DL (ref 0–0.4)
BILIRUB SERPL-MCNC: 0.4 MG/DL (ref 0.2–1.3)
BUN SERPL-MCNC: 11 MG/DL (ref 7–20)
CALCIUM: 8.6 MG/DL (ref 8.4–10.2)
CHLORIDE SERPL-SCNC: 98 MMOL/L (ref 98–107)
CO2 SERPL-SCNC: 28 MMOL/L (ref 22–30)
EOSINOPHIL # BLD AUTO: 0.1 10^3/UL (ref 0–0.6)
EOSINOPHIL NFR BLD AUTO: 1.5 % (ref 0–6)
ERYTHROCYTE [DISTWIDTH] IN BLOOD BY AUTOMATED COUNT: 12.8 % (ref 11.5–14)
GLUCOSE SERPL-MCNC: 101 MG/DL (ref 75–110)
HCT VFR BLD CALC: 41.1 % (ref 37.9–51)
HGB BLD-MCNC: 14.6 G/DL (ref 13.5–17)
LYMPHOCYTES # BLD AUTO: 1.4 10^3/UL (ref 0.5–4.7)
LYMPHOCYTES NFR BLD AUTO: 26.1 % (ref 13–45)
MCH RBC QN AUTO: 33.8 PG (ref 27–33.4)
MCHC RBC AUTO-ENTMCNC: 35.6 G/DL (ref 32–36)
MCV RBC AUTO: 95 FL (ref 80–97)
MONOCYTES # BLD AUTO: 0.5 10^3/UL (ref 0.1–1.4)
MONOCYTES NFR BLD AUTO: 9.6 % (ref 3–13)
NEUTROPHILS # BLD AUTO: 3.4 10^3/UL (ref 1.7–8.2)
NEUTS SEG NFR BLD AUTO: 62.5 % (ref 42–78)
PLATELET # BLD: 197 10^3/UL (ref 150–450)
POTASSIUM SERPL-SCNC: 4 MMOL/L (ref 3.6–5)
PROT SERPL-MCNC: 7.5 G/DL (ref 6.3–8.2)
RBC # BLD AUTO: 4.33 10^6/UL (ref 4.35–5.55)
TOTAL CELLS COUNTED % (AUTO): 100 %
WBC # BLD AUTO: 5.4 10^3/UL (ref 4–10.5)

## 2019-12-12 PROCEDURE — 71045 X-RAY EXAM CHEST 1 VIEW: CPT

## 2019-12-12 PROCEDURE — 80053 COMPREHEN METABOLIC PANEL: CPT

## 2019-12-12 PROCEDURE — 85025 COMPLETE CBC W/AUTO DIFF WBC: CPT

## 2019-12-12 NOTE — RADIOLOGY REPORT (SQ)
EXAM DESCRIPTION:  CHEST SINGLE VIEW



COMPLETED DATE/TIME:  12/12/2019 11:36 am



REASON FOR STUDY:  R 50.9



COMPARISON:  6/11/2015.



EXAM PARAMETERS:  NUMBER OF VIEWS: One view.

TECHNIQUE: Single frontal radiographic view of the chest acquired.

RADIATION DOSE: NA

LIMITATIONS: None.



FINDINGS:  LUNGS AND PLEURA: Prominent bilateral interstitial reticulonodular opacities, greatest wit
hin the left lung base.  No pleural effusion or pneumothorax.

MEDIASTINUM AND HILAR STRUCTURES: No masses.  Contour normal.

HEART AND VASCULAR STRUCTURES: Heart normal in size.  Normal vasculature.

BONES: No acute findings.

HARDWARE: None in the chest.

OTHER: No other significant finding.



IMPRESSION:  Bilateral reticulonodular interstitial opacities greatest within the left lung base, pos
sibly atypical infection.  No significant effusion



TECHNICAL DOCUMENTATION:  JOB ID:  [id number]

 2011 [employer]- All Rights Reserved



Reading location - IP/workstation name: [email or web address]